# Patient Record
Sex: MALE | NOT HISPANIC OR LATINO | Employment: OTHER | URBAN - METROPOLITAN AREA
[De-identification: names, ages, dates, MRNs, and addresses within clinical notes are randomized per-mention and may not be internally consistent; named-entity substitution may affect disease eponyms.]

---

## 2022-01-01 ENCOUNTER — OFFICE VISIT (OUTPATIENT)
Dept: URGENT CARE | Facility: URGENT CARE | Age: 57
End: 2022-01-01
Payer: MEDICAID

## 2022-01-01 VITALS
DIASTOLIC BLOOD PRESSURE: 90 MMHG | HEART RATE: 79 BPM | TEMPERATURE: 96.1 F | OXYGEN SATURATION: 100 % | WEIGHT: 142.2 LBS | SYSTOLIC BLOOD PRESSURE: 210 MMHG

## 2022-01-01 DIAGNOSIS — N18.30 STAGE 3 CHRONIC KIDNEY DISEASE, UNSPECIFIED WHETHER STAGE 3A OR 3B CKD (H): ICD-10-CM

## 2022-01-01 DIAGNOSIS — I10 HYPERTENSION, UNSPECIFIED TYPE: ICD-10-CM

## 2022-01-01 DIAGNOSIS — E11.65 TYPE 2 DIABETES MELLITUS WITH HYPERGLYCEMIA, UNSPECIFIED WHETHER LONG TERM INSULIN USE (H): ICD-10-CM

## 2022-01-01 DIAGNOSIS — R51.9 ACUTE NONINTRACTABLE HEADACHE, UNSPECIFIED HEADACHE TYPE: ICD-10-CM

## 2022-01-01 DIAGNOSIS — I16.0 HYPERTENSIVE URGENCY: Primary | ICD-10-CM

## 2022-01-01 DIAGNOSIS — R94.31 NONSPECIFIC ABNORMAL ELECTROCARDIOGRAM (ECG) (EKG): ICD-10-CM

## 2022-01-01 PROCEDURE — 99204 OFFICE O/P NEW MOD 45 MIN: CPT | Performed by: PHYSICIAN ASSISTANT

## 2022-01-01 PROCEDURE — 93000 ELECTROCARDIOGRAM COMPLETE: CPT | Performed by: PHYSICIAN ASSISTANT

## 2022-05-20 NOTE — PROGRESS NOTES
Chief Complaint   Patient presents with     Headache     Started this morning, feel hot, denies chest pain        EKG with lots of artifact as he would not sit still.  Sinus rhythm.  Rate 73.  Left atrial enlargement.  No prior EKG for comparison.      ASSESSMENT:     ICD-10-CM    1. Hypertensive urgency  I16.0 EKG 12-lead complete w/read - Clinics   2. Acute nonintractable headache, unspecified headache type  R51.9    3. Nonspecific abnormal electrocardiogram (ECG) (EKG)  R94.31    4. Stage 3 chronic kidney disease, unspecified whether stage 3a or 3b CKD (H)  N18.30    5. Type 2 diabetes mellitus with hyperglycemia, unspecified whether long term insulin use (H)  E11.65    6. Hypertension, unspecified type  I10            PLAN: Hypertensive urgency.  Severe headache this morning but now mild.  Seems to be some confusion.  Limited on labs and imaging.  To ER for further evaluation and treatment.  Unable to obtain electrolytes and get them back in a timely fashion.  Abnormal EKG here today which shows left atrial enlargement.  No prior EKG for comparison.  I have discussed clinical findings with patient.  Side effects of medications discussed.  Symptomatic care is discussed.  I have discussed the possibility of  worsening symptoms and indication to RTC or go to the ER if they occur.  All questions are answered, patient indicates understanding of these issues and is in agreement with plan.   Patient care instructions are discussed/given at the end of visit.   Lots of rest and fluids.      Jillian Ferreira PA-C      SUBJECTIVE:  56-year-old presents for severe frontal headache that he had this morning that lasted about an hour.  Now just mild.  No nausea or vomiting.  No vision changes.  No paresthesias.  No cough, sore throat, nasal congestion, rash.  No ear pain or ringing.  No fever.  He denies any health problems but chart reviewed and it seems he has diabetes, hypertension and chronic kidney disease.  His blood  pressure is extremely elevated here today.  He states he has not taken any medication for any of these things for quite some time.  No chest pain or shortness of breath.  No abdominal pain.        No Known Allergies    No past medical history on file.    No current outpatient medications on file prior to visit.  No current facility-administered medications on file prior to visit.      Social History     Tobacco Use     Smoking status: Never Smoker     Smokeless tobacco: Never Used   Substance Use Topics     Alcohol use: Not Currently       ROS:  CONSTITUTIONAL: Negative for fatigue or fever.  EYES: Negative for eye problems.  ENT: Neg for ST.  RESP: Neg for SHORTNESS OF BREATH.  CV: Negative for chest pains.  GI: Negative for vomiting.  MUSCULOSKELETAL:  Negative for significant muscle or joint pains.  NEUROLOGIC: as above.  SKIN: Negative for rash.  PSYCH: Normal mentation for age.    OBJECTIVE:  BP (!) 217/84 (BP Location: Right arm, Patient Position: Sitting, Cuff Size: Adult Regular)   Pulse 79   Temp (!) 96.1  F (35.6  C) (Tympanic)   Wt 64.5 kg (142 lb 3.2 oz)   SpO2 100%   GENERAL APPEARANCE: Healthy, alert and no distress.  EYES:Conjunctiva/sclera clear.  EARS: No cerumen.   Ear canals w/o erythema.  TM's intact w/o erythema.    THROAT: No erythema w/o tonsillar enlargement . No exudates.  NECK: Supple, nontender, no lymphadenopathy.  RESP: Lungs clear to auscultation - no rales, rhonchi or wheezes  CV: Regular rate and rhythm, normal S1 S2, no murmur noted.  NEURO: Awake, alert    SKIN: No rashes  Negative pronator drift.  Smile symmetric.  No tongue deviation.  Cranial nerves II through XII grossly intact.      Jillian Ferreira PA-C